# Patient Record
Sex: FEMALE | Race: BLACK OR AFRICAN AMERICAN | NOT HISPANIC OR LATINO | URBAN - METROPOLITAN AREA
[De-identification: names, ages, dates, MRNs, and addresses within clinical notes are randomized per-mention and may not be internally consistent; named-entity substitution may affect disease eponyms.]

---

## 2019-04-15 ENCOUNTER — EMERGENCY (EMERGENCY)
Facility: HOSPITAL | Age: 24
LOS: 1 days | Discharge: HOME | End: 2019-04-15
Attending: EMERGENCY MEDICINE | Admitting: EMERGENCY MEDICINE
Payer: SELF-PAY

## 2019-04-15 VITALS
TEMPERATURE: 99 F | HEIGHT: 62 IN | DIASTOLIC BLOOD PRESSURE: 71 MMHG | HEART RATE: 88 BPM | SYSTOLIC BLOOD PRESSURE: 116 MMHG | OXYGEN SATURATION: 98 % | RESPIRATION RATE: 20 BRPM | WEIGHT: 110.01 LBS

## 2019-04-15 DIAGNOSIS — S69.92XA UNSPECIFIED INJURY OF LEFT WRIST, HAND AND FINGER(S), INITIAL ENCOUNTER: ICD-10-CM
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
## 2019-04-15 DIAGNOSIS — S63.502A UNSPECIFIED SPRAIN OF LEFT WRIST, INITIAL ENCOUNTER: ICD-10-CM

## 2019-04-15 DIAGNOSIS — T74.11XA ADULT PHYSICAL ABUSE, CONFIRMED, INITIAL ENCOUNTER: ICD-10-CM
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
## 2019-04-15 PROCEDURE — 99283 EMERGENCY DEPT VISIT LOW MDM: CPT

## 2019-04-15 PROCEDURE — 99053 MED SERV 10PM-8AM 24 HR FAC: CPT

## 2019-04-15 PROCEDURE — 99283 EMERGENCY DEPT VISIT LOW MDM: CPT | Mod: 25

## 2019-04-15 PROCEDURE — 73110 X-RAY EXAM OF WRIST: CPT | Mod: 26,LT

## 2019-04-15 PROCEDURE — 73110 X-RAY EXAM OF WRIST: CPT

## 2019-04-15 RX ORDER — IBUPROFEN 200 MG
600 TABLET ORAL ONCE
Qty: 0 | Refills: 0 | Status: COMPLETED | OUTPATIENT
Start: 2019-04-15 | End: 2019-04-15

## 2019-04-15 RX ADMIN — Medication 600 MILLIGRAM(S): at 23:26

## 2019-04-15 RX ADMIN — Medication 600 MILLIGRAM(S): at 23:03

## 2019-04-15 NOTE — ED ADULT NURSE NOTE - NSIMPLEMENTINTERV_GEN_ALL_ED
Implemented All Universal Safety Interventions:  [place] to call system. Call bell, personal items and telephone within reach. Instruct patient to call for assistance. Room bathroom lighting operational. Non-slip footwear when patient is off stretcher. Physically safe environment: no spills, clutter or unnecessary equipment. Stretcher in lowest position, wheels locked, appropriate side rails in place.

## 2019-04-15 NOTE — ED PROVIDER NOTE - NSFOLLOWUPINSTRUCTIONS_ED_ALL_ED_FT
Use wrist splint for pain control    Follow up with orthopedist recommended below.     Wrist Sprain, Adult  A wrist sprain is a stretch or tear in the strong, fibrous tissues (ligaments) that connect your wrist bones. There are three types of wrist sprains:    Grade 1. In this type of sprain, the ligament is stretched more than normal.  Grade 2. In this type of sprain, the ligament is partially torn. You may be able to move your wrist, but not very much.  Grade 3. In this type of sprain, the ligament or muscle is completely torn. You may find it difficult or extremely painful to move your wrist even a little.    What are the causes?  A wrist sprain can be caused by using the wrist too much during sports, exercise, or at work. It can also happen with a fall or during an accident.    What increases the risk?  This condition is more likely to occur in people:    With a previous wrist or arm injury.  With poor wrist strength and flexibility.  Who play contact sports, such as football or soccer.  Who play sports that may result in a fall, such as skateboarding, biking, skiing, or snowboarding.  Who do not exercise regularly.  Who use exercise equipment that does not fit well.    What are the signs or symptoms?  Symptoms of this condition include:    Pain in the wrist, arm, or hand.  Swelling or bruised skin near the wrist, hand, or arm. The skin may look yellow or kind of blue.  Stiffness or trouble moving the hand.  Hearing a pop or feeling a tear at the time of the injury.  A warm feeling in the skin around the wrist.    How is this diagnosed?  This condition is diagnosed with a physical exam. Sometimes an X-ray is taken to make sure a bone did not break. If your health care provider thinks that you tore a ligament, he or she may order an MRI of your wrist.    How is this treated?  This condition is treated by resting and applying ice to your wrist. Additional treatment may include:    Medicine for pain and inflammation.  A splint to keep your wrist still (immobilized).  Exercises to strengthen and stretch your wrist.  Surgery. This may be done if the ligament is completely torn.    Follow these instructions at home:  If you have a splint:     Image   Do not put pressure on any part of the splint until it is fully hardened. This may take several hours.  Wear the splint as told by your health care provider. Remove it only as told by your health care provider.  Loosen the splint if your fingers tingle, become numb, or turn cold and blue.  If your splint is not waterproof:    Do not let it get wet.  Cover it with a watertight covering when you take a bath or a shower.    Keep the splint clean.  Managing pain, stiffness, and swelling     Image   If directed, put ice on the injured area.    If you have a removable splint, remove it as told by your health care provider.  Put ice in a plastic bag.  Place a towel between your skin and the bag or between the splint and the bag.  Leave the ice on for 20 minutes, 2–3 times per day.    Move your fingers often to avoid stiffness and to lessen swelling.  Raise (elevate) the injured area above the level of your heart while you are sitting or lying down.  Activity     Rest your wrist. Do not do things that cause pain.  Return to your normal activities as told by your health care provider. Ask your health care provider what activities are safe for you.  Do exercises as told by your health care provider.  General instructions     Take over-the-counter and prescription medicines only as told by your health care provider.  Do not use any products that contain nicotine or tobacco, such as cigarettes and e-cigarettes. These can delay healing. If you need help quitting, ask your health care provider.  Ask your health care provider when it is safe to drive if you have a splint.  Keep all follow-up visits as told by your health care provider. This is important.  Contact a health care provider if:  Your pain, bruising, or swelling gets worse.  Your skin becomes red, gets a rash, or has open sores.  Your pain does not get better or it gets worse.  Get help right away if:  You have a new or sudden sharp pain in the hand, arm, or wrist.  You have tingling or numbness in your hand.  Your fingers turn white, very red, or cold and blue.  You cannot move your fingers.  This information is not intended to replace advice given to you by your health care provider. Make sure you discuss any questions you have with your health care provider.    Document Released: 08/21/2015 Document Revised: 07/15/2017 Document Reviewed: 07/06/2017  [employer] Interactive Patient Education © 2019 [employer]
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
## 2019-04-15 NOTE — ED PROVIDER NOTE - CARE PROVIDER_API CALL
Kaushal Alicea)  Orthopaedic Surgery  [address]  [place], [place] [address]  Phone: [phone]  Fax: [phone]  Follow Up Time:
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
## 2019-04-15 NOTE — ED PROVIDER NOTE - MUSCULOSKELETAL, MLM
left wrist - pain over radial side with some mild swelling, no elbow tenderness, no distal nv deficit, distal pulses intact, no elbow or shoulder tenderness, FROM at these joints
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
## 2020-03-19 ENCOUNTER — EMERGENCY (EMERGENCY)
Facility: HOSPITAL | Age: 25
LOS: 1 days | Discharge: HOME | End: 2020-03-19
Admitting: EMERGENCY MEDICINE
Payer: MEDICAID

## 2020-03-19 VITALS
WEIGHT: 190.04 LBS | DIASTOLIC BLOOD PRESSURE: 69 MMHG | OXYGEN SATURATION: 97 % | TEMPERATURE: 97 F | SYSTOLIC BLOOD PRESSURE: 124 MMHG | HEART RATE: 89 BPM | RESPIRATION RATE: 18 BRPM | HEIGHT: 65 IN

## 2020-03-19 DIAGNOSIS — R21 RASH AND OTHER NONSPECIFIC SKIN ERUPTION: ICD-10-CM

## 2020-03-19 DIAGNOSIS — B00.9 HERPESVIRAL INFECTION, UNSPECIFIED: ICD-10-CM

## 2020-03-19 PROCEDURE — 99283 EMERGENCY DEPT VISIT LOW MDM: CPT

## 2020-03-19 RX ORDER — VALACYCLOVIR 500 MG/1
1 TABLET, FILM COATED ORAL
Qty: 14 | Refills: 0
Start: 2020-03-19 | End: 2020-03-25

## 2020-03-19 RX ORDER — LANOLIN/MINERAL OIL
1 LOTION (ML) TOPICAL
Qty: 28 | Refills: 0
Start: 2020-03-19 | End: 2020-03-25

## 2020-03-19 NOTE — ED ADULT NURSE NOTE - OBJECTIVE STATEMENT
Pt presents with C/O rash to bilateral inner thighs/groin region x 2 days. Denies fever, denies travel, denies sick contacts. Reports recent weight gain ans feels this may be a contributing factor  (+)pruritis (+)discomfort with ambulation

## 2020-03-19 NOTE — ED PROVIDER NOTE - SKIN, MLM
Skin normal color for race, warm, dry and intact. No evidence of rash. Groin area inferior to external labia vesicle lesions that have been excoriated; no acute signs of secondary infection.

## 2020-03-19 NOTE — ED PROVIDER NOTE - OBJECTIVE STATEMENT
24y F with no PMHx presents to the ED with complains of bilateral inner thigh rash for the past 2x days. Patient describes rash as a burning sensation, tenderness, with blister like structures.

## 2020-03-19 NOTE — ED ADULT NURSE NOTE - NSIMPLEMENTINTERV_GEN_ALL_ED
Implemented All Universal Safety Interventions:  [place] to call system. Call bell, personal items and telephone within reach. Instruct patient to call for assistance. Room bathroom lighting operational. Non-slip footwear when patient is off stretcher. Physically safe environment: no spills, clutter or unnecessary equipment. Stretcher in lowest position, wheels locked, appropriate side rails in place.

## 2020-03-19 NOTE — ED PROVIDER NOTE - CLINICAL SUMMARY MEDICAL DECISION MAKING FREE TEXT BOX
Recommend to avoid touching area, practice proper hand washing, and do not have intercourse until all lesions resolves. Follow up with gyn.

## 2020-03-19 NOTE — ED PROVIDER NOTE - PATIENT PORTAL LINK FT
You can access the FollowMyHealth Patient Portal offered by [facility] by registering at the following website: [email or web address]. By joining [email or web address]’s FollowMyHealth portal, you will also be able to view your health information using other applications (apps) compatible with our system.

## 2020-03-19 NOTE — ED PROVIDER NOTE - NSFOLLOWUPINSTRUCTIONS_ED_ALL_ED_FT
[facility]    Genital Herpes  Genital herpes is a common sexually transmitted infection (STI) that is caused by a virus. The virus spreads from person to person through sexual contact. Infection can cause itching, blisters, and sores around the genitals or rectum. Symptoms may last several days and then go away This is called an outbreak. However, the virus remains in your body, so you may have more outbreaks in the future. The time between outbreaks varies and can be months or years.  Genital herpes affects men and women. It is particularly concerning for pregnant women because the virus can be passed to the baby during delivery and can cause serious problems. Genital herpes is also a concern for people who have a weak disease-fighting (immune) system.  What are the causes?  This condition is caused by the herpes simplex virus (HSV) type 1 or type 2. The virus may spread through:  Sexual contact with an infected person, including vaginal, anal, and oral sex.Contact with fluid from a herpes sore.The skin. This means that you can get herpes from an infected partner even if he or she does not have a visible sore or does not know that he or she is infected.What increases the risk?  You are more likely to develop this condition if:  You have sex with many partners.You do not use latex condoms during sex.What are the signs or symptoms?  Most people do not have symptoms (asymptomatic) or have mild symptoms that may be mistaken for other skin problems. Symptoms may include:  Small red bumps near the genitals, rectum, or mouth. These bumps turn into blisters and then turn into sores.Flu-like symptoms, including:  Fever.Body aches.Swollen lymph nodes.Headache.Painful urination.Pain and itching in the genital area or rectal area.Vaginal discharge.Tingling or shooting pain in the legs and buttocks.Generally, symptoms are more severe and last longer during the first (primary) outbreak. Flu-like symptoms are also more common during the primary outbreak.  How is this diagnosed?  Genital herpes may be diagnosed based on:  A physical exam.Your medical history.Blood tests.A test of a fluid sample (culture) from an open sore.How is this treated?  There is no cure for this condition, but treatment with antiviral medicines that are taken by mouth (orally) can do the following:  Speed up healing and relieve symptoms.Help to reduce the spread of the virus to sexual partners.Limit the chance of future outbreaks, or make future outbreaks shorter.Lessen symptoms of future outbreaks.Your health care provider may also recommend pain relief medicines, such as aspirin or ibuprofen.  Follow these instructions at home:  Sexual activity     Do not have sexual contact during active outbreaks.Practice safe sex. Latex condoms and female condoms may help prevent the spread of the herpes virus.General instructions     Keep the affected areas dry and clean.Take over-the-counter and prescription medicines only as told by your health care provider.Avoid rubbing or touching blisters and sores. If you do touch blisters or sores:  Wash your hands thoroughly with soap and water.Do not touch your eyes afterward.To help relieve pain or itching, you may take the following actions as directed by your health care provider:  Apply a cold, wet cloth (cold compress) to affected areas 4–6 times a day.Apply a substance that protects your skin and reduces bleeding (astringent).Apply a gel that helps relieve pain around sores (lidocaine gel).Take a warm, shallow bath that cleans the genital area (sitz bath).Keep all follow-up visits as told by your health care provider. This is important.How is this prevented?  Use condoms. Although anyone can get genital herpes during sexual contact, even with the use of a condom, a condom can provide some protection.Avoid having multiple sexual partners.Talk with your sexual partner about any symptoms either of you may have. Also, talk with your partner about any history of STIs.Get tested for STIs before you have sex. Ask your partner to do the same.Do not have sexual contact if you have symptoms of genital herpes.Contact a health care provider if:  Your symptoms are not improving with medicine.Your symptoms return.You have new symptoms.You have a fever.You have abdominal pain.You have redness, swelling, or pain in your eye.You notice new sores on other parts of your body.You are a woman and experience bleeding between menstrual periods.You have had herpes and you become pregnant or plan to become pregnant.Summary  Genital herpes is a common sexually transmitted infection (STI) that is caused by the herpes simplex virus (HSV) type 1 or type 2.These viruses are most often spread through sexual contact with an infected person.You are more likely to develop this condition if you have sex with many partners or you have unprotected sex.Most people do not have symptoms (asymptomatic) or have mild symptoms that may be mistaken for other skin problems. Symptoms occur as outbreaks that may happen months or years apart.There is no cure for this condition, but treatment with oral antiviral medicines can reduce symptoms, reduce the chance of spreading the virus to a partner, prevent future outbreaks, or shorten future outbreaks.This information is not intended to replace advice given to you by your health care provider. Make sure you discuss any questions you have with your health care provider.    Document Released: 12/15/2001 Document Revised: 11/17/2017 Document Reviewed: 11/17/2017  [employer] Interactive Patient Education © 2020 [employer] Genital Herpes  Genital herpes is a common sexually transmitted infection (STI) that is caused by a virus. The virus spreads from person to person through sexual contact. Infection can cause itching, blisters, and sores around the genitals or rectum. Symptoms may last several days and then go away This is called an outbreak. However, the virus remains in your body, so you may have more outbreaks in the future. The time between outbreaks varies and can be months or years.  Genital herpes affects men and women. It is particularly concerning for pregnant women because the virus can be passed to the baby during delivery and can cause serious problems. Genital herpes is also a concern for people who have a weak disease-fighting (immune) system.  What are the causes?  This condition is caused by the herpes simplex virus (HSV) type 1 or type 2. The virus may spread through:  Sexual contact with an infected person, including vaginal, anal, and oral sex.Contact with fluid from a herpes sore.The skin. This means that you can get herpes from an infected partner even if he or she does not have a visible sore or does not know that he or she is infected.What increases the risk?  You are more likely to develop this condition if:  You have sex with many partners.You do not use latex condoms during sex.What are the signs or symptoms?  Most people do not have symptoms (asymptomatic) or have mild symptoms that may be mistaken for other skin problems. Symptoms may include:  Small red bumps near the genitals, rectum, or mouth. These bumps turn into blisters and then turn into sores.Flu-like symptoms, including:  Fever.Body aches.Swollen lymph nodes.Headache.Painful urination.Pain and itching in the genital area or rectal area.Vaginal discharge.Tingling or shooting pain in the legs and buttocks.Generally, symptoms are more severe and last longer during the first (primary) outbreak. Flu-like symptoms are also more common during the primary outbreak.  How is this diagnosed?  Genital herpes may be diagnosed based on:  A physical exam.Your medical history.Blood tests.A test of a fluid sample (culture) from an open sore.How is this treated?  There is no cure for this condition, but treatment with antiviral medicines that are taken by mouth (orally) can do the following:  Speed up healing and relieve symptoms.Help to reduce the spread of the virus to sexual partners.Limit the chance of future outbreaks, or make future outbreaks shorter.Lessen symptoms of future outbreaks.Your health care provider may also recommend pain relief medicines, such as aspirin or ibuprofen.  Follow these instructions at home:  Sexual activity     Do not have sexual contact during active outbreaks.Practice safe sex. Latex condoms and female condoms may help prevent the spread of the herpes virus.General instructions     Keep the affected areas dry and clean.Take over-the-counter and prescription medicines only as told by your health care provider.Avoid rubbing or touching blisters and sores. If you do touch blisters or sores:  Wash your hands thoroughly with soap and water.Do not touch your eyes afterward.To help relieve pain or itching, you may take the following actions as directed by your health care provider:  Apply a cold, wet cloth (cold compress) to affected areas 4–6 times a day.Apply a substance that protects your skin and reduces bleeding (astringent).Apply a gel that helps relieve pain around sores (lidocaine gel).Take a warm, shallow bath that cleans the genital area (sitz bath).Keep all follow-up visits as told by your health care provider. This is important.How is this prevented?  Use condoms. Although anyone can get genital herpes during sexual contact, even with the use of a condom, a condom can provide some protection.Avoid having multiple sexual partners.Talk with your sexual partner about any symptoms either of you may have. Also, talk with your partner about any history of STIs.Get tested for STIs before you have sex. Ask your partner to do the same.Do not have sexual contact if you have symptoms of genital herpes.Contact a health care provider if:  Your symptoms are not improving with medicine.Your symptoms return.You have new symptoms.You have a fever.You have abdominal pain.You have redness, swelling, or pain in your eye.You notice new sores on other parts of your body.You are a woman and experience bleeding between menstrual periods.You have had herpes and you become pregnant or plan to become pregnant.Summary  Genital herpes is a common sexually transmitted infection (STI) that is caused by the herpes simplex virus (HSV) type 1 or type 2.These viruses are most often spread through sexual contact with an infected person.You are more likely to develop this condition if you have sex with many partners or you have unprotected sex.Most people do not have symptoms (asymptomatic) or have mild symptoms that may be mistaken for other skin problems. Symptoms occur as outbreaks that may happen months or years apart.There is no cure for this condition, but treatment with oral antiviral medicines can reduce symptoms, reduce the chance of spreading the virus to a partner, prevent future outbreaks, or shorten future outbreaks.This information is not intended to replace advice given to you by your health care provider. Make sure you discuss any questions you have with your health care provider.    Document Released: 12/15/2001 Document Revised: 11/17/2017 Document Reviewed: 11/17/2017  [employer] Interactive Patient Education © 2020 [employer]

## 2020-11-05 NOTE — ED ADULT NURSE NOTE - CADM POA PRESS ULCER
Pt arrives for scheduled induction of labor, , 39 3/7 weeks gestation, has been feeling contractions since about 0100, has been feeling baby move, denies rom, denies vaginal bleeding, was 1cm dilated in office at last visit. Patient to bathroom to void, informed of maternal drug testing policy in place on all laboring patients. Verbal consent received, paper consent to be signed and urine to be sent. Explained patients right to have family, representative or physician notified of their admission. Patient has Declined for physician to be notified. Patient has Declined for family/representative to be notified. No
